# Patient Record
Sex: FEMALE | Employment: UNEMPLOYED | URBAN - METROPOLITAN AREA
[De-identification: names, ages, dates, MRNs, and addresses within clinical notes are randomized per-mention and may not be internally consistent; named-entity substitution may affect disease eponyms.]

---

## 2024-11-11 ENCOUNTER — HOSPITAL ENCOUNTER (EMERGENCY)
Facility: HOSPITAL | Age: 24
Discharge: HOME | End: 2024-11-11
Attending: EMERGENCY MEDICINE
Payer: MEDICAID

## 2024-11-11 VITALS
HEART RATE: 67 BPM | HEIGHT: 59 IN | OXYGEN SATURATION: 97 % | RESPIRATION RATE: 20 BRPM | TEMPERATURE: 98 F | DIASTOLIC BLOOD PRESSURE: 58 MMHG | SYSTOLIC BLOOD PRESSURE: 104 MMHG | WEIGHT: 188 LBS | BODY MASS INDEX: 37.9 KG/M2

## 2024-11-11 DIAGNOSIS — O21.9 VOMITING DURING PREGNANCY: ICD-10-CM

## 2024-11-11 DIAGNOSIS — O46.90 VAGINAL BLEEDING IN PREGNANCY: Primary | ICD-10-CM

## 2024-11-11 LAB
ALBUMIN SERPL-MCNC: 3.3 G/DL (ref 3.5–5)
ALBUMIN/GLOB SERPL: 1 RATIO (ref 1.1–2)
ALP SERPL-CCNC: 69 UNIT/L (ref 40–150)
ALT SERPL-CCNC: 18 UNIT/L (ref 0–55)
ANION GAP SERPL CALC-SCNC: 9 MEQ/L
AST SERPL-CCNC: 15 UNIT/L (ref 5–34)
B-HCG FREE SERPL-ACNC: ABNORMAL MIU/ML
B-HCG UR QL: POSITIVE
BACTERIA #/AREA URNS AUTO: ABNORMAL /HPF
BASOPHILS # BLD AUTO: 0.02 X10(3)/MCL
BASOPHILS NFR BLD AUTO: 0.2 %
BILIRUB SERPL-MCNC: 0.2 MG/DL
BILIRUB UR QL STRIP.AUTO: NEGATIVE
BUN SERPL-MCNC: 7.4 MG/DL (ref 7–18.7)
CALCIUM SERPL-MCNC: 8.8 MG/DL (ref 8.4–10.2)
CHLORIDE SERPL-SCNC: 106 MMOL/L (ref 98–107)
CLARITY UR: CLEAR
CO2 SERPL-SCNC: 24 MMOL/L (ref 22–29)
COLOR UR AUTO: ABNORMAL
CREAT SERPL-MCNC: 0.68 MG/DL (ref 0.55–1.02)
CREAT/UREA NIT SERPL: 11
EOSINOPHIL # BLD AUTO: 0.14 X10(3)/MCL (ref 0–0.9)
EOSINOPHIL NFR BLD AUTO: 1.6 %
ERYTHROCYTE [DISTWIDTH] IN BLOOD BY AUTOMATED COUNT: 12.7 % (ref 11.5–17)
GFR SERPLBLD CREATININE-BSD FMLA CKD-EPI: >60 ML/MIN/1.73/M2
GLOBULIN SER-MCNC: 3.2 GM/DL (ref 2.4–3.5)
GLUCOSE SERPL-MCNC: 88 MG/DL (ref 74–100)
GLUCOSE UR QL STRIP: NORMAL
GROUP & RH: NORMAL
HCT VFR BLD AUTO: 35.4 % (ref 37–47)
HGB BLD-MCNC: 12.2 G/DL (ref 12–16)
HGB UR QL STRIP: ABNORMAL
IMM GRANULOCYTES # BLD AUTO: 0.05 X10(3)/MCL (ref 0–0.04)
IMM GRANULOCYTES NFR BLD AUTO: 0.6 %
INDIRECT COOMBS: NORMAL
KETONES UR QL STRIP: NEGATIVE
LEUKOCYTE ESTERASE UR QL STRIP: 250
LYMPHOCYTES # BLD AUTO: 2.7 X10(3)/MCL (ref 0.6–4.6)
LYMPHOCYTES NFR BLD AUTO: 31.1 %
MCH RBC QN AUTO: 28.6 PG (ref 27–31)
MCHC RBC AUTO-ENTMCNC: 34.5 G/DL (ref 33–36)
MCV RBC AUTO: 83.1 FL (ref 80–94)
MONOCYTES # BLD AUTO: 0.61 X10(3)/MCL (ref 0.1–1.3)
MONOCYTES NFR BLD AUTO: 7 %
NEUTROPHILS # BLD AUTO: 5.17 X10(3)/MCL (ref 2.1–9.2)
NEUTROPHILS NFR BLD AUTO: 59.5 %
NITRITE UR QL STRIP: NEGATIVE
NRBC BLD AUTO-RTO: 0 %
PH UR STRIP: 6 [PH]
PLATELET # BLD AUTO: 305 X10(3)/MCL (ref 130–400)
PMV BLD AUTO: 8.7 FL (ref 7.4–10.4)
POTASSIUM SERPL-SCNC: 3.6 MMOL/L (ref 3.5–5.1)
PROT SERPL-MCNC: 6.5 GM/DL (ref 6.4–8.3)
PROT UR QL STRIP: NEGATIVE
RBC # BLD AUTO: 4.26 X10(6)/MCL (ref 4.2–5.4)
RBC #/AREA URNS AUTO: ABNORMAL /HPF
SODIUM SERPL-SCNC: 139 MMOL/L (ref 136–145)
SP GR UR STRIP.AUTO: 1.02 (ref 1–1.03)
SPECIMEN OUTDATE: NORMAL
SQUAMOUS #/AREA URNS LPF: ABNORMAL /HPF
UROBILINOGEN UR STRIP-ACNC: NORMAL
WBC # BLD AUTO: 8.69 X10(3)/MCL (ref 4.5–11.5)
WBC #/AREA URNS AUTO: ABNORMAL /HPF

## 2024-11-11 PROCEDURE — 81001 URINALYSIS AUTO W/SCOPE: CPT | Performed by: EMERGENCY MEDICINE

## 2024-11-11 PROCEDURE — 81025 URINE PREGNANCY TEST: CPT | Performed by: EMERGENCY MEDICINE

## 2024-11-11 PROCEDURE — 86850 RBC ANTIBODY SCREEN: CPT | Performed by: EMERGENCY MEDICINE

## 2024-11-11 PROCEDURE — 85025 COMPLETE CBC W/AUTO DIFF WBC: CPT | Performed by: EMERGENCY MEDICINE

## 2024-11-11 PROCEDURE — 99284 EMERGENCY DEPT VISIT MOD MDM: CPT | Mod: 25

## 2024-11-11 PROCEDURE — 80053 COMPREHEN METABOLIC PANEL: CPT | Performed by: EMERGENCY MEDICINE

## 2024-11-11 PROCEDURE — 84702 CHORIONIC GONADOTROPIN TEST: CPT | Performed by: EMERGENCY MEDICINE

## 2024-11-11 NOTE — ED PROVIDER NOTES
"Encounter Date: 2024       History     Chief Complaint   Patient presents with    Vaginal Bleeding     Pt presents c/o lower abd pain and vaginal bleeding starting at 2200 11/10/24. Pt states 12 weeks pregnant. Pt states nausea denies vomiting.      23yo  at 13w4d by LMP 24 who presents to ED for complaints of lower abdominal pain and vaginal bleeding. Patient states bleeding started around 22:00 last night, described as spotting after wiping from urination. Abdominal pain has been going on for 1.5-2 weeks, pain described as intermittent "period cramp" lasting 5-10 minutes. Patient had vomiting this morning. Also states mild leg swelling and headache that improves with OTC pain medication. Denies any vision changes. Hx 2 c-sections. Only takes PNVs.       Review of patient's allergies indicates:  No Known Allergies  No past medical history on file.  No past surgical history on file.  No family history on file.     Review of Systems   Constitutional:  Negative for fever.   Respiratory:  Negative for shortness of breath.    Cardiovascular:  Negative for chest pain.   Gastrointestinal:  Positive for abdominal pain, nausea and vomiting.   Genitourinary:  Positive for vaginal bleeding.   Neurological:  Positive for headaches.       Physical Exam     Initial Vitals [24 0404]   BP Pulse Resp Temp SpO2   119/75 95 20 97.9 °F (36.6 °C) 99 %      MAP       --         Physical Exam    Constitutional: No distress.   Cardiovascular:  Normal rate and regular rhythm.           Pulmonary/Chest: No respiratory distress.   Abdominal: Abdomen is soft.   Suprapubic abdominal tenderness There is no rebound and no guarding.   Musculoskeletal:      Comments: Trace lower extremity edema     Skin: Skin is warm and dry.         ED Course   Procedures  Labs Reviewed   COMPREHENSIVE METABOLIC PANEL - Abnormal       Result Value    Sodium 139      Potassium 3.6      Chloride 106      CO2 24      Glucose 88      Blood Urea " Nitrogen 7.4      Creatinine 0.68      Calcium 8.8      Protein Total 6.5      Albumin 3.3 (*)     Globulin 3.2      Albumin/Globulin Ratio 1.0 (*)     Bilirubin Total 0.2      ALP 69      ALT 18      AST 15      eGFR >60      Anion Gap 9.0      BUN/Creatinine Ratio 11     PREGNANCY TEST, URINE RAPID - Abnormal    hCG Qualitative, Urine Positive (*)    URINALYSIS, REFLEX TO URINE CULTURE - Abnormal    Color, UA Light-Yellow      Appearance, UA Clear      Specific Gravity, UA 1.017      pH, UA 6.0      Protein, UA Negative      Glucose, UA Normal      Ketones, UA Negative      Blood, UA 2+ (*)     Bilirubin, UA Negative      Urobilinogen, UA Normal      Nitrites, UA Negative      Leukocyte Esterase,  (*)     RBC, UA 0-5      WBC, UA 6-10 (*)     Bacteria, UA Trace      Squamous Epithelial Cells, UA Trace     CBC WITH DIFFERENTIAL - Abnormal    WBC 8.69      RBC 4.26      Hgb 12.2      Hct 35.4 (*)     MCV 83.1      MCH 28.6      MCHC 34.5      RDW 12.7      Platelet 305      MPV 8.7      Neut % 59.5      Lymph % 31.1      Mono % 7.0      Eos % 1.6      Basophil % 0.2      Lymph # 2.70      Neut # 5.17      Mono # 0.61      Eos # 0.14      Baso # 0.02      IG# 0.05 (*)     IG% 0.6      NRBC% 0.0     HCG, QUANTITATIVE - Abnormal    Beta HCG Quant 32,314.01 (*)    CBC W/ AUTO DIFFERENTIAL    Narrative:     The following orders were created for panel order CBC auto differential.  Procedure                               Abnormality         Status                     ---------                               -----------         ------                     CBC with Differential[[id number]]       Abnormal            Final result                 Please view results for these tests on the individual orders.   TYPE & SCREEN    Group & Rh B POS      Indirect Malachi GEL NEG      Specimen Outdate 11/14/2024 23:59            Imaging Results              US OB <14 Wks, TransAbd, Single Gestation (Final result)  Result time  24 06:13:41      Final result by Yuki Lowe MD (24 06:13:41)                   Impression:      Single viable intrauterine pregnancy with heart rate of 145 beats per minute and AUA of 13 weeks and 3 days.      Electronically signed by: Yuki Lowe  Date:    2024  Time:    06:13               Narrative:    EXAMINATION:  US OB <14 WEEKS TRANSABDOM, SINGLE GESTATION    CLINICAL HISTORY:  bleeding;    TECHNIQUE:  Transabdominal ultrasound of the pelvis.    COMPARISON:  None    FINDINGS:  The uterus measures 15.6 x 7 x 7 x 11.3 cm in size.  A single intrauterine pregnancy is identified.  Crown-rump length measures 7.4 cm corresponding to gestational age of 13 weeks and 3 days.  Fetal heart rate measures average of 145 beats per minute.  There is no subchronic hemorrhage.  The ovaries are not visualized.  There is no adnexal mass or free fluid.                                       Medications - No data to display  Medical Decision Making  25yo  at 13w4d based on LMP 24 who presents for complaints of abdominal pain and vaginal bleeding onset last night. VSS on presentation. PE with mild lower abdominal ttp. Labwork and US reviewed, IUP confirmed. Patient is B+ blood type. Reassured and d/c patient with rx for unisom prn vomiting. Advised patient to keep appt with initial OB visit.     Risk  OTC drugs.                                      Clinical Impression:  Final diagnoses:  [O46.90] Vaginal bleeding in pregnancy (Primary)  [O21.9] Vomiting during pregnancy          ED Disposition Condition    Discharge Stable          ED Prescriptions       Medication Sig Dispense Start Date End Date Auth. Provider    doxylamine succinate (UNISOM, DOXYLAMINE,) 25 mg tablet Take 1 tablet (25 mg total) by mouth nightly as needed (vomiting). 30 tablet 2024 -- Vin Shannon MD          Follow-up Information       Follow up With Specialties Details Why Contact Info    [facility] -  Emergency Dept Emergency Medicine  As needed, If symptoms worsen [address] [place] [id number] [phone]    [facility] - Family Medicine Family Medicine On 11/21/2024 keep this appt [address] [id number] [phone]             Vin Shannon MD  Resident  11/11/24 0623       Vin Shannon MD  Resident  11/11/24 0623

## 2024-11-21 ENCOUNTER — OFFICE VISIT (OUTPATIENT)
Dept: FAMILY MEDICINE | Facility: CLINIC | Age: 24
End: 2024-11-21
Payer: MEDICAID

## 2024-11-21 VITALS
HEART RATE: 78 BPM | DIASTOLIC BLOOD PRESSURE: 78 MMHG | WEIGHT: 182 LBS | RESPIRATION RATE: 20 BRPM | HEIGHT: 59 IN | OXYGEN SATURATION: 98 % | BODY MASS INDEX: 36.69 KG/M2 | TEMPERATURE: 99 F | SYSTOLIC BLOOD PRESSURE: 117 MMHG

## 2024-11-21 DIAGNOSIS — Z3A.15 15 WEEKS GESTATION OF PREGNANCY: Primary | ICD-10-CM

## 2024-11-21 LAB
BACTERIA #/AREA URNS AUTO: ABNORMAL /HPF
BILIRUB SERPL-MCNC: NORMAL MG/DL
BILIRUB UR QL STRIP.AUTO: NEGATIVE
BLOOD URINE, POC: NORMAL
C TRACH DNA SPEC QL NAA+PROBE: NOT DETECTED
CLARITY UR: ABNORMAL
CLARITY, POC UA: CLEAR
COLOR UR AUTO: ABNORMAL
COLOR, POC UA: YELLOW
GLUCOSE UR QL STRIP: NORMAL
GLUCOSE UR QL STRIP: NORMAL
GROUP & RH: NORMAL
HBV SURFACE AG SERPL QL IA: NONREACTIVE
HCV AB SERPL QL IA: NONREACTIVE
HGB S BLD QL SOLY: NEGATIVE
HGB UR QL STRIP: ABNORMAL
HIV 1+2 AB+HIV1 P24 AG SERPL QL IA: NONREACTIVE
HYALINE CASTS #/AREA URNS LPF: ABNORMAL /LPF
INDIRECT COOMBS: NORMAL
KETONES UR QL STRIP: NEGATIVE
KETONES UR QL STRIP: NORMAL
LEUKOCYTE ESTERASE UR QL STRIP: 75
LEUKOCYTE ESTERASE URINE, POC: NORMAL
MUCOUS THREADS URNS QL MICRO: ABNORMAL /LPF
N GONORRHOEA DNA SPEC QL NAA+PROBE: NOT DETECTED
NITRITE UR QL STRIP: NEGATIVE
NITRITE, POC UA: NORMAL
PH UR STRIP: 6.5 [PH]
PH, POC UA: 7
PROT UR QL STRIP: NEGATIVE
PROTEIN, POC: NORMAL
RBC #/AREA URNS AUTO: ABNORMAL /HPF
SOURCE (OHS): NORMAL
SP GR UR STRIP.AUTO: 1.02 (ref 1–1.03)
SPECIFIC GRAVITY, POC UA: 1.02
SPECIMEN OUTDATE: NORMAL
SQUAMOUS #/AREA URNS LPF: ABNORMAL /HPF
T PALLIDUM AB SER QL: NONREACTIVE
UROBILINOGEN UR STRIP-ACNC: NORMAL
UROBILINOGEN, POC UA: 0.2
WBC #/AREA URNS AUTO: ABNORMAL /HPF

## 2024-11-21 PROCEDURE — 87389 HIV-1 AG W/HIV-1&-2 AB AG IA: CPT

## 2024-11-21 PROCEDURE — 86900 BLOOD TYPING SEROLOGIC ABO: CPT

## 2024-11-21 PROCEDURE — 81511 FTL CGEN ABNOR FOUR ANAL: CPT

## 2024-11-21 PROCEDURE — 99214 OFFICE O/P EST MOD 30 MIN: CPT | Mod: PBBFAC

## 2024-11-21 PROCEDURE — 85660 RBC SICKLE CELL TEST: CPT

## 2024-11-21 PROCEDURE — 87340 HEPATITIS B SURFACE AG IA: CPT

## 2024-11-21 PROCEDURE — 36415 COLL VENOUS BLD VENIPUNCTURE: CPT

## 2024-11-21 PROCEDURE — 86787 VARICELLA-ZOSTER ANTIBODY: CPT

## 2024-11-21 PROCEDURE — 81001 URINALYSIS AUTO W/SCOPE: CPT

## 2024-11-21 PROCEDURE — 87491 CHLMYD TRACH DNA AMP PROBE: CPT

## 2024-11-21 PROCEDURE — 86780 TREPONEMA PALLIDUM: CPT

## 2024-11-21 PROCEDURE — 86762 RUBELLA ANTIBODY: CPT

## 2024-11-21 PROCEDURE — 87086 URINE CULTURE/COLONY COUNT: CPT

## 2024-11-21 PROCEDURE — 86803 HEPATITIS C AB TEST: CPT

## 2024-11-21 NOTE — PATIENT INSTRUCTIONS
Well Child Exam    About this topic  A well child exam is a visit with your child's doctor to check your child's health. The doctor will check your child's growth, progress, and shot record. It is also a time for you to ask your child's doctor any questions you have about your child's health. Your child will have a full exam during the office visit. Other things that are sometimes checked are hearing, eyesight, and urine or blood tests. The doctor may give shots during your child's well visit.    General    Getting Ready for a Well Child Exam    A well child exam is a good time for you to talk with your child's doctor about any of these topics:    Eating habits or diet    How your child acts    Sleep issues    Growth    Safety    Vaccines    Toilet training    Teen years    How your child is doing in school or any learning concerns    Home life    You may want to make a written list of the things you want to talk about with your child's doctor. Be sure to bring your list of questions to your child's well visit. You may also want to do some research on your own before your office visit by reading books or looking at Web sites. Other family members, child caregivers, and grandparents may be able to help you too. Your child's doctor may ask also you about your family's health history or if your child is around anyone who smokes.    The Exam    The doctor measures your child's weight, height, and sometimes head size or body mass index (BMI). The doctor plots these numbers on a growth curve. The growth curve gives a picture of your baby's growth at each visit. The doctor may check your child's temperature, blood pressure, breathing, and heart rate. The doctor may listen to your child's heart, lungs, and belly. Your doctor will do a full exam of your child from the head to the toes.    Growth and Development Questions    Your doctor will ask you about your child's progress. The doctor will focus on the skills that are  likely to happen at your child's age. Some of these are motor skills like rolling over, walking, and running, while others are social skills, or how your child interacts with other people. Your child's doctor will also ask you how your child is doing in school.    Help for Parents    Your doctor will talk with you about any concerns you have about your child during this visit. The doctor may also talk with you about:    Getting family help or other support    Ways to help your child's brain growth    How your child plays and acts with others    Ways to help your child exercise    Safety    Eating habits    Vaccines    Quitting smoking    Help if you have a low mood after having a baby    Shots or Vaccines    It is important for your child to get shots on time. This protects from very serious illnesses like pertussis, measles, or some kinds of pneumonia. Sometimes, your child may need more than one dose of vaccine. The vaccines used today are safer than ever. Talk to your doctor if you have any questions or concerns about giving your child vaccines.    Well Child Exam Schedule    The American Academy of Pediatrics (AAP) suggests this plan for well child visits:    [place]oll[place] (3 to 5 days old)    1 month old    2 months old    4 months old    6 months old    9 months old    12 months old    15 months old    18 months old    2 years old    30 months old    3 years old    4 years old    Once each year until age 21    Well child exams are very important. Since your child is healthy at this visit and it is scheduled ahead of time, you can think about things you want to ask your child's doctor. Be sure to follow the above plan for well child visits as well as any other visits your child's doctor suggests.    Where can I learn more?    Centers for Disease Control and Prevention    http://www.cdc.gov/vaccines     Healthy  Children    https://www.healthychildren.org/English/family-life/health-management/Pages/Well-Child-Care-A-Check-Up-for-Success.aspx    Disclaimer.  This generalized information is a limited summary of diagnosis, treatment, and/or medication information. It is not meant to be comprehensive and should be used as a tool to help the user understand and/or assess potential diagnostic and treatment options. It does NOT include all information about conditions, treatments, medications, side effects, or risks that may apply to a specific patient. It is not intended to be medical advice or a substitute for the medical advice, diagnosis, or treatment of a health care provider based on the health care provider's examination and assessment of a patients specific and unique circumstances. Patients must speak with a health care provider for complete information about their health, medical questions, and treatment options, including any risks or benefits regarding use of medications. This information does not endorse any treatments or medications as safe, effective, or approved for treating a specific patient. Up[employer]Date, Inc[employer] and its affiliates disclaim any warranty or liability relating to this information or the use thereof. The use of this information is governed by the Terms of Use, available at Terms of Use. ©2022 [employer]ToDate, Inc. and its affiliates and/or licensors. All rights reserved.
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
## 2024-11-22 LAB
# FETUSES: 1
2ND TRIMESTER 4 SCREEN SERPL-IMP: NORMAL
AFP ADJ MOM SERPL: 0.99 MOM
AFP SERPL IA-MCNC: 24.7 NG/ML
AGE AT DELIVERY: NORMAL
B-HCG ADJ MOM SERPL: 0.69 MOM
COLLECT DATE: NORMAL
CURRENT SMOKER: NORMAL
FET TS 21 RISK FROM MAT AGE: NORMAL
GA EST FROM LMP: NORMAL WK,D
GA METHOD: NORMAL
HCG SERPL IA-ACNC: 28.2 IU/ML
HX OF NTD QL: NO
HX OF NTD QL: NO
HX OF TRISOMY 21 QL: NO
IDDM PATIENT QL: NO
INHIBIN A ADJ MOM SERPL: 0.78 MOM
INHIBIN SERPL-MCNC: 121 PG/ML
IVF PREGNANCY: NO
LABORATORY COMMENT REPORT: NORMAL
M PHYSICIAN PHONE NUMBER: NORMAL
MATERNAL RISK FACTORS: NORMAL
NEURAL TUBE DEFECT RISK FETUS: NORMAL %
RECOM F/U: NORMAL
RUBV IGG SERPL IA-ACNC: 2.1
RUBV IGG SERPL QL IA: POSITIVE
TEST PERFORMANCE INFO SPEC: NORMAL
TS 18 RISK FETUS: NORMAL
TS 21 RISK FETUS: NORMAL
U ESTRIOL ADJ MOM SERPL: 1.57 MOM
U ESTRIOL SERPL-MCNC: 0.81 NG/ML
VZV IGG SER IA-ACNC: 7.6
VZV IGG SER QL IA: POSITIVE

## 2024-11-23 LAB — BACTERIA UR CULT: NO GROWTH

## 2024-11-25 DIAGNOSIS — Z3A.15 15 WEEKS GESTATION OF PREGNANCY: Primary | ICD-10-CM
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
## 2024-12-19 ENCOUNTER — OFFICE VISIT (OUTPATIENT)
Dept: FAMILY MEDICINE | Facility: CLINIC | Age: 24
End: 2024-12-19
Payer: MEDICAID

## 2024-12-19 VITALS
DIASTOLIC BLOOD PRESSURE: 81 MMHG | WEIGHT: 184.81 LBS | SYSTOLIC BLOOD PRESSURE: 128 MMHG | HEIGHT: 59 IN | BODY MASS INDEX: 37.26 KG/M2 | OXYGEN SATURATION: 99 % | TEMPERATURE: 98 F | HEART RATE: 96 BPM | RESPIRATION RATE: 20 BRPM

## 2024-12-19 DIAGNOSIS — Z98.891 PREVIOUS CESAREAN SECTION: ICD-10-CM

## 2024-12-19 DIAGNOSIS — Z3A.19 19 WEEKS GESTATION OF PREGNANCY: Primary | ICD-10-CM

## 2024-12-19 DIAGNOSIS — Z30.8 ENCOUNTER FOR TUBAL LIGATION COUNSELING: ICD-10-CM

## 2024-12-19 LAB
BASOPHILS # BLD AUTO: 0.03 X10(3)/MCL
BASOPHILS NFR BLD AUTO: 0.3 %
BILIRUB SERPL-MCNC: NEGATIVE MG/DL
BLOOD URINE, POC: NEGATIVE
CLARITY, POC UA: CLEAR
COLOR, POC UA: NORMAL
EOSINOPHIL # BLD AUTO: 0.1 X10(3)/MCL (ref 0–0.9)
EOSINOPHIL NFR BLD AUTO: 1 %
ERYTHROCYTE [DISTWIDTH] IN BLOOD BY AUTOMATED COUNT: 13.5 % (ref 11.5–17)
GLUCOSE UR QL STRIP: NEGATIVE
HCT VFR BLD AUTO: 37 % (ref 37–47)
HGB BLD-MCNC: 12.4 G/DL (ref 12–16)
IMM GRANULOCYTES # BLD AUTO: 0.05 X10(3)/MCL (ref 0–0.04)
IMM GRANULOCYTES NFR BLD AUTO: 0.5 %
KETONES UR QL STRIP: NEGATIVE
LEUKOCYTE ESTERASE URINE, POC: NEGATIVE
LYMPHOCYTES # BLD AUTO: 2.31 X10(3)/MCL (ref 0.6–4.6)
LYMPHOCYTES NFR BLD AUTO: 23.2 %
MCH RBC QN AUTO: 28.5 PG (ref 27–31)
MCHC RBC AUTO-ENTMCNC: 33.5 G/DL (ref 33–36)
MCV RBC AUTO: 85.1 FL (ref 80–94)
MONOCYTES # BLD AUTO: 0.62 X10(3)/MCL (ref 0.1–1.3)
MONOCYTES NFR BLD AUTO: 6.2 %
NEUTROPHILS # BLD AUTO: 6.84 X10(3)/MCL (ref 2.1–9.2)
NEUTROPHILS NFR BLD AUTO: 68.8 %
NITRITE, POC UA: NEGATIVE
NRBC BLD AUTO-RTO: 0 %
PH, POC UA: 6
PLATELET # BLD AUTO: 388 X10(3)/MCL (ref 130–400)
PMV BLD AUTO: 9 FL (ref 7.4–10.4)
PROTEIN, POC: NORMAL
RBC # BLD AUTO: 4.35 X10(6)/MCL (ref 4.2–5.4)
SPECIFIC GRAVITY, POC UA: 1.03
UROBILINOGEN, POC UA: NORMAL
WBC # BLD AUTO: 9.95 X10(3)/MCL (ref 4.5–11.5)

## 2024-12-19 PROCEDURE — 85025 COMPLETE CBC W/AUTO DIFF WBC: CPT

## 2024-12-19 PROCEDURE — 99213 OFFICE O/P EST LOW 20 MIN: CPT | Mod: PBBFAC

## 2024-12-19 PROCEDURE — 81511 FTL CGEN ABNOR FOUR ANAL: CPT

## 2024-12-19 PROCEDURE — 36415 COLL VENOUS BLD VENIPUNCTURE: CPT

## 2025-01-06 ENCOUNTER — OFFICE VISIT (OUTPATIENT)
Dept: MATERNAL FETAL MEDICINE | Facility: CLINIC | Age: 25
End: 2025-01-06
Payer: MEDICAID

## 2025-01-06 ENCOUNTER — PROCEDURE VISIT (OUTPATIENT)
Dept: MATERNAL FETAL MEDICINE | Facility: CLINIC | Age: 25
End: 2025-01-06
Payer: MEDICAID

## 2025-01-06 VITALS
HEIGHT: 59 IN | DIASTOLIC BLOOD PRESSURE: 78 MMHG | SYSTOLIC BLOOD PRESSURE: 122 MMHG | WEIGHT: 183.88 LBS | BODY MASS INDEX: 37.07 KG/M2 | HEART RATE: 97 BPM

## 2025-01-06 DIAGNOSIS — Z3A.15 15 WEEKS GESTATION OF PREGNANCY: ICD-10-CM

## 2025-01-06 DIAGNOSIS — O99.212 OBESITY AFFECTING PREGNANCY IN SECOND TRIMESTER, UNSPECIFIED OBESITY TYPE: ICD-10-CM

## 2025-01-06 DIAGNOSIS — O09.892 HISTORY OF PRETERM DELIVERY, CURRENTLY PREGNANT IN SECOND TRIMESTER: Primary | ICD-10-CM

## 2025-01-06 NOTE — PROGRESS NOTES
"MATERNAL-FETAL MEDICINE   CONSULT NOTE    Provider requesting consultation: [facility]    SUBJECTIVE:     Ms. Cecile Lr is a 24 y.o.  female with IUP at 21w4d who is seen in consultation by MFM for evaluation and management of:  Problem   - History of  delivery, currently pregnant in second trimester   -BMI affecting pregnancy in second trimester      utilized for visit  Cecile is being referred for a history of spontaneous  birth at 36w.   BMI 37-  no early 1h  Negative quad screen       Medication List with Changes/Refills   Current Medications    PRENATAL VIT NO.124/IRON/FOLIC (PRENATAL VITAMIN ORAL)    Take by mouth.       Review of patient's allergies indicates:  No Known Allergies    PMH:History reviewed. No pertinent past medical history.    PObHx:  OB History    Para Term  AB Living   3 2 1 1 0 2   SAB IAB Ectopic Multiple Live Births   0 0 0 0 2      # Outcome Date GA Lbr Jose/2nd Weight Sex Type Anes PTL Lv   3 Current            2  21 36w0d  2.268 kg (5 lb) F CS-LTranv EPI Y CAITLIN   1 Term 10/07/16 40w0d  3.402 kg (7 lb 8 oz) F CS-LTranv EPI N CAITLIN      Complications: Fetal Intolerance       PSH:  Past Surgical History:   Procedure Laterality Date     SECTION      x2       Family history:family history includes No Known Problems in her father and mother.    Social history: reports that she has never smoked. She has never been exposed to tobacco smoke. She has never used smokeless tobacco. She reports that she does not currently use alcohol. She reports that she does not use drugs.    Genetic history: The patient denies any inherited genetic diseases or birth defects in herself or her partner's personal history or family.    Objective:   /78 (BP Location: Right arm, Patient Position: Sitting)   Pulse 97   Ht 4' 11" (1.499 m)   Wt 83.4 kg (183 lb 13.8 oz)   LMP 2024 (Exact Date)   BMI 37.14 kg/m²     Ultrasound performed. See " viewpoint for full ultrasound report.    A detailed fetal anatomic ultrasound examination was performed for the following high risk indication: BMI.   No fetal structural malformations are identified; however, fetal imaging is incomplete today.   A follow-up study will be scheduled to complete the fetal anatomic survey.   Fetal size today is consistent with established gestational age.   Transvaginal cervical length measures 4.4cm.  Placental location is anterior without evidence of previa.     ASSESSMENT/PLAN:     24 y.o.  female with IUP at 21w4d     - History of  delivery, currently pregnant in second trimester  She has a history of sPTB at 36 weeks gestation.   In patients with a history of spontaneous  delivery prior to 37 weeks gestation, [facility] currently recommends consideration of the use of progesterone therapy for the prevention of recurrent  birth.  Temitope and its generic formulations (17-OHP) have been recently pulled from the market, therefore, we no longer recommend its usage for the prevention of  birth.  There is increasing evidence supporting the use of alternative forms of progesterone supplementation (ie vaginal progesterone 200 mg qhs) for women have been thoroughly counseled regarding the benefits, risks, costs, and logistics of each form of progesterone supplementation.  We also discussed recommendations for cervical length monitoring biweekly from 16 weeks until 22 weeks 6 days gestation as this may also help identify patients in whom cerclage can be considered to reduce the risk of  birth as well.     25- TVU CL >30mm.   She declines to return for repeat cervical length examination in 2 weeks.    Recommendations   Consider alternative supplementation with vaginal progesterone 200 mg qhs IF cervical shortening encountered   PTL precautions      -BMI affecting pregnancy in second trimester  There are  risks associated with obesity which  include and increased risk of hypertension, preeclampsia, gestational diabetes, venous thromboembolic disease,  delivery, macrosomia (with resultant shoulder dystocia, obstetric sphincter injury), IUFD, longer first stage of labor, decreased TOLAC success rate, emergent & scheduled  & complications of  (prolonged OR time, delayed delivery, excessive EBL, infection, wound separation/infection, higher spinal failure rate, more difficult intubation).  Obesity is also associated with fetal anomalies, specifically neural tube defects.  Studies have shown that the rate of complication increases with rising BMI and thus pregnancies with maternal morbid obesity are at increased risk.      BMI 37    Recommendations:  TWG goal is 11-20 lbs  Screen for signs/symptoms of obstructive sleep apnea  Nutritionist consult offered (this is to be ordered by primary OB provider)  Consider early 1 hr GCT (not completed); routine at 24 weeks gestation  Consider low dose aspirin 81 mg daily at 12-16 weeks for preeclampsia risk reduction  Targeted anatomical survey scheduled at 18-20 weeks  Fetal growth ultrasound at 32 weeks if BMI >= 40  Weekly  testing at 32 weeks if pre-pregnancy BMI >= 45  Lovenox 40 mg BID for VTE prophylaxis while admitted to the hospital (antepartum or postpartum) if BMI >= 40.   Encouraged breastfeeding  Postpartum lifestyle modifications & weight loss        FOLLOW UP:   F/u in 4 weeks for US/MFM visit    This consultation was completed with the assistance of Brenda Ribeiro NP.        Carolyn Antunez MD  Maternal Fetal Medicine

## 2025-01-06 NOTE — ASSESSMENT & PLAN NOTE
She has a history of sPTB at 36 weeks gestation.   In patients with a history of spontaneous  delivery prior to 37 weeks gestation, [facility] currently recommends consideration of the use of progesterone therapy for the prevention of recurrent  birth.  Temitope and its generic formulations (17-OHP) have been recently pulled from the market, therefore, we no longer recommend its usage for the prevention of  birth.  There is increasing evidence supporting the use of alternative forms of progesterone supplementation (ie vaginal progesterone 200 mg qhs) for women have been thoroughly counseled regarding the benefits, risks, costs, and logistics of each form of progesterone supplementation.  We also discussed recommendations for cervical length monitoring biweekly from 16 weeks until 22 weeks 6 days gestation as this may also help identify patients in whom cerclage can be considered to reduce the risk of  birth as well.     25- TVU CL >30mm.   She declines to return for repeat cervical length examination in 2 weeks.    Recommendations   Consider alternative supplementation with vaginal progesterone 200 mg qhs IF cervical shortening encountered   PTL precautions

## 2025-01-06 NOTE — ASSESSMENT & PLAN NOTE
There are  risks associated with obesity which include and increased risk of hypertension, preeclampsia, gestational diabetes, venous thromboembolic disease,  delivery, macrosomia (with resultant shoulder dystocia, obstetric sphincter injury), IUFD, longer first stage of labor, decreased TOLAC success rate, emergent & scheduled  & complications of  (prolonged OR time, delayed delivery, excessive EBL, infection, wound separation/infection, higher spinal failure rate, more difficult intubation).  Obesity is also associated with fetal anomalies, specifically neural tube defects.  Studies have shown that the rate of complication increases with rising BMI and thus pregnancies with maternal morbid obesity are at increased risk.      BMI 37    Recommendations:  TWG goal is 11-20 lbs  Screen for signs/symptoms of obstructive sleep apnea  Nutritionist consult offered (this is to be ordered by primary OB provider)  Consider early 1 hr GCT (not completed); routine at 24 weeks gestation  Consider low dose aspirin 81 mg daily at 12-16 weeks for preeclampsia risk reduction  Targeted anatomical survey scheduled at 18-20 weeks  Fetal growth ultrasound at 32 weeks if BMI >= 40  Weekly  testing at 32 weeks if pre-pregnancy BMI >= 45  Lovenox 40 mg BID for VTE prophylaxis while admitted to the hospital (antepartum or postpartum) if BMI >= 40.   Encouraged breastfeeding  Postpartum lifestyle modifications & weight loss